# Patient Record
Sex: MALE | Race: WHITE | NOT HISPANIC OR LATINO
[De-identification: names, ages, dates, MRNs, and addresses within clinical notes are randomized per-mention and may not be internally consistent; named-entity substitution may affect disease eponyms.]

---

## 2017-02-27 ENCOUNTER — APPOINTMENT (OUTPATIENT)
Dept: SURGERY | Facility: CLINIC | Age: 52
End: 2017-02-27

## 2017-02-27 VITALS
DIASTOLIC BLOOD PRESSURE: 88 MMHG | RESPIRATION RATE: 15 BRPM | SYSTOLIC BLOOD PRESSURE: 149 MMHG | OXYGEN SATURATION: 98 % | HEART RATE: 65 BPM | TEMPERATURE: 97.9 F

## 2017-02-27 DIAGNOSIS — M22.40 CHONDROMALACIA PATELLAE, UNSPECIFIED KNEE: ICD-10-CM

## 2017-02-27 DIAGNOSIS — K62.89 OTHER SPECIFIED DISEASES OF ANUS AND RECTUM: ICD-10-CM

## 2017-02-27 DIAGNOSIS — K61.3 ISCHIORECTAL ABSCESS: ICD-10-CM

## 2017-02-27 DIAGNOSIS — M47.816 SPONDYLOSIS W/OUT MYELOPATHY OR RADICULOPATHY, LUMBAR REGION: ICD-10-CM

## 2017-02-27 DIAGNOSIS — K61.0 ANAL ABSCESS: ICD-10-CM

## 2017-02-27 RX ORDER — ASPIRIN 81 MG
81 TABLET, DELAYED RELEASE (ENTERIC COATED) ORAL
Refills: 0 | Status: ACTIVE | COMMUNITY

## 2017-02-27 RX ORDER — LACTOBACILLUS RHAMNOSUS GG 15B CELL
CAPSULE, SPRINKLE ORAL
Refills: 0 | Status: ACTIVE | COMMUNITY

## 2018-02-06 ENCOUNTER — TRANSCRIPTION ENCOUNTER (OUTPATIENT)
Age: 53
End: 2018-02-06

## 2019-03-05 ENCOUNTER — TRANSCRIPTION ENCOUNTER (OUTPATIENT)
Age: 54
End: 2019-03-05

## 2019-05-22 ENCOUNTER — APPOINTMENT (OUTPATIENT)
Dept: UROLOGY | Facility: CLINIC | Age: 54
End: 2019-05-22
Payer: COMMERCIAL

## 2019-05-22 VITALS — SYSTOLIC BLOOD PRESSURE: 143 MMHG | HEART RATE: 60 BPM | DIASTOLIC BLOOD PRESSURE: 85 MMHG | OXYGEN SATURATION: 98 %

## 2019-05-22 DIAGNOSIS — Z80.42 FAMILY HISTORY OF MALIGNANT NEOPLASM OF PROSTATE: ICD-10-CM

## 2019-05-22 PROCEDURE — 99203 OFFICE O/P NEW LOW 30 MIN: CPT

## 2020-06-01 ENCOUNTER — APPOINTMENT (OUTPATIENT)
Dept: NEUROLOGY | Facility: CLINIC | Age: 55
End: 2020-06-01

## 2021-02-09 ENCOUNTER — APPOINTMENT (OUTPATIENT)
Dept: MRI IMAGING | Facility: CLINIC | Age: 56
End: 2021-02-09

## 2021-02-09 ENCOUNTER — OUTPATIENT (OUTPATIENT)
Dept: OUTPATIENT SERVICES | Facility: HOSPITAL | Age: 56
LOS: 1 days | End: 2021-02-09
Payer: SELF-PAY

## 2021-02-09 DIAGNOSIS — Z00.8 ENCOUNTER FOR OTHER GENERAL EXAMINATION: ICD-10-CM

## 2021-02-09 PROCEDURE — 72141 MRI NECK SPINE W/O DYE: CPT | Mod: 26

## 2021-02-09 PROCEDURE — 72141 MRI NECK SPINE W/O DYE: CPT

## 2022-05-31 ENCOUNTER — APPOINTMENT (OUTPATIENT)
Dept: UROLOGY | Facility: CLINIC | Age: 57
End: 2022-05-31
Payer: COMMERCIAL

## 2022-05-31 VITALS
DIASTOLIC BLOOD PRESSURE: 97 MMHG | HEIGHT: 70 IN | SYSTOLIC BLOOD PRESSURE: 133 MMHG | HEART RATE: 67 BPM | WEIGHT: 190 LBS | TEMPERATURE: 98.3 F | BODY MASS INDEX: 27.2 KG/M2

## 2022-05-31 PROCEDURE — 99204 OFFICE O/P NEW MOD 45 MIN: CPT

## 2022-05-31 NOTE — HISTORY OF PRESENT ILLNESS
[FreeTextEntry1] : Dr. Landon Richmond \par [address]\[place] [address]\par \par CC: Elevated PSA \par \par 57 year old man with elevated PSA\par  \par PSA 1.7 (2017)\par PSA 2.4 (2019) \par \par Most recent PSA 3.01 ng/ml\par \par US was ordered; 42 cc prostate \par MRI 34 cc size, PIRAD2\par \par Excellent sexual function\par Urinary associated symptoms include nocturia 0-1x,. 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

## 2022-05-31 NOTE — PHYSICAL EXAM
[General Appearance - Well Developed] : well developed [General Appearance - Well Nourished] : well nourished [Normal Appearance] : normal appearance [Well Groomed] : well groomed [General Appearance - In No Acute Distress] : no acute distress [Edema] : no peripheral edema [] : no respiratory distress [Respiration, Rhythm And Depth] : normal respiratory rhythm and effort [Exaggerated Use Of Accessory Muscles For Inspiration] : no accessory muscle use [Abdomen Soft] : soft [Abdomen Tenderness] : non-tender [Costovertebral Angle Tenderness] : no ~M costovertebral angle tenderness [Urethral Meatus] : meatus normal [Penis Abnormality] : normal circumcised penis [Urinary Bladder Findings] : the bladder was normal on palpation [Scrotum] : the scrotum was normal [Epididymis] : the epididymides were normal [Testes Tenderness] : no tenderness of the testes [Testes Mass (___cm)] : there were no testicular masses [Prostate Tenderness] : the prostate was not tender [No Prostate Nodules] : no prostate nodules [No Focal Deficits] : no focal deficits [Oriented To Time, Place, And Person] : oriented to person, place, and time [Affect] : the affect was normal [Mood] : the mood was normal [Not Anxious] : not anxious [No Palpable Adenopathy] : no palpable adenopathy

## 2022-12-06 ENCOUNTER — APPOINTMENT (OUTPATIENT)
Dept: UROLOGY | Facility: CLINIC | Age: 57
End: 2022-12-06

## 2022-12-06 VITALS
TEMPERATURE: 98.3 F | DIASTOLIC BLOOD PRESSURE: 85 MMHG | OXYGEN SATURATION: 98 % | SYSTOLIC BLOOD PRESSURE: 148 MMHG | HEART RATE: 83 BPM

## 2022-12-06 DIAGNOSIS — Z00.00 ENCOUNTER FOR GENERAL ADULT MEDICAL EXAMINATION W/OUT ABNORMAL FINDINGS: ICD-10-CM

## 2022-12-06 DIAGNOSIS — R97.20 ELEVATED PROSTATE, SPECIFIC ANTIGEN [PSA]: ICD-10-CM

## 2022-12-06 PROCEDURE — 99214 OFFICE O/P EST MOD 30 MIN: CPT

## 2022-12-06 NOTE — HISTORY OF PRESENT ILLNESS
[FreeTextEntry1] : Dr. 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

## 2022-12-06 NOTE — ASSESSMENT
[FreeTextEntry1] : Diagnosis:\par Elevated PSA\par \par \par Plan:\par Normal LILIANA, reassuring MRI\par PSA today, if remains stable follow up in 1 year

## 2022-12-07 LAB
ALBUMIN SERPL ELPH-MCNC: 4.9 G/DL
ALP BLD-CCNC: 67 U/L
ALT SERPL-CCNC: 50 U/L
ANION GAP SERPL CALC-SCNC: 13 MMOL/L
AST SERPL-CCNC: 32 U/L
BASOPHILS # BLD AUTO: 0.05 K/UL
BASOPHILS NFR BLD AUTO: 0.6 %
BILIRUB SERPL-MCNC: 0.4 MG/DL
BUN SERPL-MCNC: 18 MG/DL
CALCIUM SERPL-MCNC: 9.6 MG/DL
CHLORIDE SERPL-SCNC: 105 MMOL/L
CHOLEST SERPL-MCNC: 223 MG/DL
CK SERPL-CCNC: 98 U/L
CO2 SERPL-SCNC: 24 MMOL/L
CREAT SERPL-MCNC: 0.78 MG/DL
EGFR: 105 ML/MIN/1.73M2
EOSINOPHIL # BLD AUTO: 0.07 K/UL
EOSINOPHIL NFR BLD AUTO: 0.9 %
ERYTHROCYTE [SEDIMENTATION RATE] IN BLOOD BY WESTERGREN METHOD: 20 MM/HR
FOLATE SERPL-MCNC: 17.7 NG/ML
GLUCOSE SERPL-MCNC: 113 MG/DL
HCT VFR BLD CALC: 42.9 %
HDLC SERPL-MCNC: 46 MG/DL
HGB BLD-MCNC: 13.9 G/DL
IMM GRANULOCYTES NFR BLD AUTO: 0.6 %
LDLC SERPL CALC-MCNC: 137 MG/DL
LYMPHOCYTES # BLD AUTO: 1.12 K/UL
LYMPHOCYTES NFR BLD AUTO: 14.1 %
MAN DIFF?: NORMAL
MCHC RBC-ENTMCNC: 29 PG
MCHC RBC-ENTMCNC: 32.4 GM/DL
MCV RBC AUTO: 89.6 FL
MONOCYTES # BLD AUTO: 0.46 K/UL
MONOCYTES NFR BLD AUTO: 5.8 %
NEUTROPHILS # BLD AUTO: 6.21 K/UL
NEUTROPHILS NFR BLD AUTO: 78 %
NONHDLC SERPL-MCNC: 177 MG/DL
PLATELET # BLD AUTO: 287 K/UL
POTASSIUM SERPL-SCNC: 4.5 MMOL/L
PROT SERPL-MCNC: 6.9 G/DL
PSA SERPL-MCNC: 3.87 NG/ML
RBC # BLD: 4.79 M/UL
RBC # FLD: 12.1 %
SODIUM SERPL-SCNC: 142 MMOL/L
T3 SERPL-MCNC: 100 NG/DL
T4 SERPL-MCNC: 7.8 UG/DL
TESTOST SERPL-MCNC: 307 NG/DL
TRIGL SERPL-MCNC: 203 MG/DL
TSH SERPL-ACNC: 1.39 UIU/ML
VIT B12 SERPL-MCNC: 1043 PG/ML
WBC # FLD AUTO: 7.96 K/UL

## 2022-12-12 ENCOUNTER — OUTPATIENT (OUTPATIENT)
Dept: OUTPATIENT SERVICES | Facility: HOSPITAL | Age: 57
LOS: 1 days | End: 2022-12-12

## 2022-12-12 ENCOUNTER — APPOINTMENT (OUTPATIENT)
Dept: MRI IMAGING | Facility: CLINIC | Age: 57
End: 2022-12-12

## 2022-12-12 PROCEDURE — 72197 MRI PELVIS W/O & W/DYE: CPT | Mod: 26

## 2022-12-13 ENCOUNTER — NON-APPOINTMENT (OUTPATIENT)
Age: 57
End: 2022-12-13

## 2023-05-23 ENCOUNTER — APPOINTMENT (OUTPATIENT)
Dept: HEART AND VASCULAR | Facility: CLINIC | Age: 58
End: 2023-05-23
Payer: COMMERCIAL

## 2023-05-23 VITALS
HEIGHT: 70 IN | SYSTOLIC BLOOD PRESSURE: 158 MMHG | WEIGHT: 198 LBS | BODY MASS INDEX: 28.35 KG/M2 | HEART RATE: 58 BPM | DIASTOLIC BLOOD PRESSURE: 94 MMHG

## 2023-05-23 DIAGNOSIS — Z78.9 OTHER SPECIFIED HEALTH STATUS: ICD-10-CM

## 2023-05-23 PROCEDURE — 99204 OFFICE O/P NEW MOD 45 MIN: CPT | Mod: 25

## 2023-05-23 PROCEDURE — 93000 ELECTROCARDIOGRAM COMPLETE: CPT

## 2023-05-23 RX ORDER — ROSUVASTATIN CALCIUM 20 MG/1
20 TABLET, FILM COATED ORAL
Refills: 0 | Status: ACTIVE | COMMUNITY

## 2023-05-23 NOTE — ASSESSMENT
[FreeTextEntry1] : pt with hyperlipidemia,hypertension and strong fh of cad \par pt started on Amlodipine 5mg po qd with bp in the 150s will schedule ett and echocardiogram for lvh

## 2023-05-23 NOTE — HISTORY OF PRESENT ILLNESS
[FreeTextEntry1] : pt with newonset hypertension pt started on amlodipine 5mg po qd \par pt has family history of mi onmmother side uncle and cousin \par pt is on crestor 20mg for years chol total 168

## 2023-05-25 ENCOUNTER — APPOINTMENT (OUTPATIENT)
Dept: HEART AND VASCULAR | Facility: CLINIC | Age: 58
End: 2023-05-25
Payer: COMMERCIAL

## 2023-05-25 VITALS
BODY MASS INDEX: 28.35 KG/M2 | WEIGHT: 198 LBS | RESPIRATION RATE: 17 BRPM | SYSTOLIC BLOOD PRESSURE: 160 MMHG | HEIGHT: 70 IN | DIASTOLIC BLOOD PRESSURE: 90 MMHG | HEART RATE: 77 BPM

## 2023-05-25 VITALS — SYSTOLIC BLOOD PRESSURE: 140 MMHG | HEART RATE: 80 BPM | DIASTOLIC BLOOD PRESSURE: 90 MMHG

## 2023-05-25 DIAGNOSIS — E78.5 HYPERLIPIDEMIA, UNSPECIFIED: ICD-10-CM

## 2023-05-25 DIAGNOSIS — I10 ESSENTIAL (PRIMARY) HYPERTENSION: ICD-10-CM

## 2023-05-25 PROCEDURE — 99214 OFFICE O/P EST MOD 30 MIN: CPT | Mod: 25

## 2023-05-25 PROCEDURE — 93015 CV STRESS TEST SUPVJ I&R: CPT

## 2023-05-25 NOTE — REASON FOR VISIT
[Hyperlipidemia] : hyperlipidemia [Hypertension] : hypertension [FreeTextEntry1] : Pt. is a 58 year old M with PMHx of HLD, new onset HTN and family hx of CAD (MI maternal uncle and cousin) who presents today for stress EKG.

## 2023-05-25 NOTE — HISTORY OF PRESENT ILLNESS
FreeTextEntry1] : Since his last visit, patient is feeling well overall. He presents today for a stress EKG. \par \par He was started on Amlodipine 5 mg last week by his PCP, Dr. Richmond for elevated BP at his appt of 140/90 mm Hg. His Amlodipine was increased to 10 mg 2 days ago as he was not getting to goal on the lower dose. \par \par He denies regular NSAID use or high salt intake in his diet. He admits to regular alcohol use, 1-2 glasses of liquor per night. No headaches with the elevated BP.\par \par He adheres to a Mediterranean-type diet.\par \par Pt. remains active boxing 3-4 times/week. No exertional chest pain or shortness of breath.\par \par Pt. denies any orthopnea, PND, palpitations, lightheadedness, syncope or lower extremity edema. \par \par ============================\par Labs/Diagnostics: \par 2022\par Lipid profile: TC: 223, T, HDL: 46, LDL: 137\par

## 2023-05-25 NOTE — ASSESSMENT
[FreeTextEntry1] : Pt. is a 58 year old M with PMHx of HLD, new onset HTN and family hx of CAD (MI maternal uncle and cousin) who presents today for stress EKG.\par \par HTN:\par - BP remains elevated on Amlodipine 10 mg daily \par - Will add Losartan 25 mg daily to his regimen for goal BP <130/<80\par - CMP (12/2022) revealed normal renal function/electrolytes \par - Encouraged the patient to monitor blood pressure at home, keep a log, and report results back to us for evaluation. Based on results, we will adjust the regimen as necessary. Also advised patient to be mindful of sodium and alcohol intake, as well as any over-the-counter medications (such as NSAIDs or decongestants) that may increase blood pressure\par \par HLD:\par - Last LDL (12/2022) 137 above goal LDL < 100 \par - Pt had recent labs with PCP, Dr Richmond - will get a copy\par - His Rosuvastatin was increased from 10 mg to 20 mg this week per his PCP \par - Coronary artery calcium (CAC) ordered for further risk stratification\par - Encouraged patient to continue healthy exercise and eating habits, focusing on a Mediterranean style of eating and aiming for the recommended 150 minutes per week of moderate physical activity \par \par ASCVD risk: \par - Elevated ASCVD risk at 10.04%\par - Stress EKG today revealed excellent exercise capacity and no ischemic EKG changes - low suspicion for significant CAD. Continue with risk factor optimization \par - Pt. to RTC for echocardiogram.\par \par Pt. to RTC in one month for BP check, CMP and echocardiogram.\par I, Dr. Saucedo, personally performed the evaluation and management (E/M) services for this established patient who presents today with (a) new problem(s)/exacerbation of (an) existing condition(s). That E/M includes conducting the clinically appropriate interval history &/or exam, assessing all new/exacerbated conditions, and establishing a new plan of care. Today, my ACP, Minoo Bose, was here to observe &/or participate in the visit & follow plan of care established by me.

## 2023-06-22 ENCOUNTER — APPOINTMENT (OUTPATIENT)
Dept: UROLOGY | Facility: CLINIC | Age: 58
End: 2023-06-22
Payer: COMMERCIAL

## 2023-06-22 VITALS
HEART RATE: 85 BPM | OXYGEN SATURATION: 100 % | WEIGHT: 198 LBS | TEMPERATURE: 97.9 F | SYSTOLIC BLOOD PRESSURE: 152 MMHG | DIASTOLIC BLOOD PRESSURE: 81 MMHG | HEIGHT: 70 IN | BODY MASS INDEX: 28.35 KG/M2

## 2023-06-22 PROCEDURE — 99213 OFFICE O/P EST LOW 20 MIN: CPT

## 2023-06-22 NOTE — HISTORY OF PRESENT ILLNESS
[FreeTextEntry1] : Dr. Landon Richmond \par [address]\[place] [address]\par \par Marielos Saucedo MD\par [address], [place] [address]\par \par \par CC: Elevated PSA\par \par 57 yr old male being followed for elevated PSA\par Urinary symptoms stable and unchanged; he is taking saw palmetto \par Recent PSA down 0.5 ng. \par \par PSA 1.7 (2017)\par PSA 2.4 (2019) \par PSA 3.87 (12/2022)\par PSA 3.36 (5/23/2023) \par \par MRI 34 cc size, PIRAD2 (5/2022) \par MRI (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

## 2023-06-22 NOTE — ASSESSMENT
[FreeTextEntry1] : Diagnosis: \par PSA screening/elevation \par BPH\par \par Plan to stop saw palmetto and reassess symptoms\par PSA and reassess in one year \par \par

## 2023-06-28 ENCOUNTER — APPOINTMENT (OUTPATIENT)
Dept: HEART AND VASCULAR | Facility: CLINIC | Age: 58
End: 2023-06-28
Payer: COMMERCIAL

## 2023-06-28 PROCEDURE — 99214 OFFICE O/P EST MOD 30 MIN: CPT | Mod: 25

## 2023-06-28 PROCEDURE — 93306 TTE W/DOPPLER COMPLETE: CPT

## 2023-06-28 RX ORDER — AMLODIPINE BESYLATE 10 MG/1
10 TABLET ORAL DAILY
Qty: 1 | Refills: 1 | Status: DISCONTINUED | COMMUNITY
Start: 1900-01-01 | End: 2023-06-28

## 2023-06-28 NOTE — ASSESSMENT
[FreeTextEntry1] : Pt. is a 58 year old M with PMHx of HLD, new onset HTN and family hx of CAD (MI maternal uncle and cousin) who presents today for echocardiogram which is wnl\par \par HTN:\par - BP remains elevated on [id number] will increase to 100mg \par \par - CMP (12/2022) revealed normal renal function/electrolytes \par - Encouraged the patient to monitor blood pressure at home, keep a log, and report results back to us for evaluation. Based on results, we will adjust the regimen as necessary. Also advised patient to be mindful of sodium and alcohol intake, as well as any over-the-counter medications (such as NSAIDs or decongestants) that may increase blood pressure\par \par HLD:\par - Last LDL (12/2022) 137 above goal LDL < 100 \par - Pt had recent labs with PCP, Dr Richmond - will get a copy\par - His Rosuvastatin was increased from 10 mg to 20 mg \par - Coronary artery calcium (CAC) ordered for further risk stratification\par - Encouraged patient to continue healthy exercise and eating habits, focusing on a Mediterranean style of eating and aiming for the recommended 150 minutes per week of moderate physical activity \par \par ASCVD risk: \par - Elevated ASCVD risk at 10.04%\par - Stress EKG today revealed excellent exercise capacity and no ischemic EKG changes - low suspicion for significant CAD. Continue with risk factor optimization \par \par \par     no

## 2023-06-28 NOTE — HISTORY OF PRESENT ILLNESS
[FreeTextEntry1] : Since his last visit, patient is feeling well overall. \par Patient stopped his amlodipine due to ankle swelling and is now taking losartan 50 mg a day\par \par He denies regular NSAID use or high salt intake in his diet. He admits to regular alcohol use, 1-2 glasses of liquor per night. No headaches with the elevated BP.\par \par He adheres to a Mediterranean-type diet.\par \par Pt. remains active boxing 3-4 times/week. No exertional chest pain or shortness of breath.\par \par Pt. denies any orthopnea, PND, palpitations, lightheadedness, syncope or lower extremity edema. \par \par ============================\par Labs/Diagnostics: \par 2022\par Lipid profile: TC: 223, T, HDL: 46, LDL: 137\par

## 2023-07-10 ENCOUNTER — APPOINTMENT (OUTPATIENT)
Dept: CT IMAGING | Facility: HOSPITAL | Age: 58
End: 2023-07-10
Payer: COMMERCIAL

## 2023-07-10 ENCOUNTER — OUTPATIENT (OUTPATIENT)
Dept: OUTPATIENT SERVICES | Facility: HOSPITAL | Age: 58
LOS: 1 days | End: 2023-07-10
Payer: SELF-PAY

## 2023-07-10 PROCEDURE — 75571 CT HRT W/O DYE W/CA TEST: CPT

## 2023-07-10 PROCEDURE — 75571 CT HRT W/O DYE W/CA TEST: CPT | Mod: 26

## 2023-08-03 ENCOUNTER — APPOINTMENT (OUTPATIENT)
Dept: HEART AND VASCULAR | Facility: CLINIC | Age: 58
End: 2023-08-03

## 2024-05-21 ENCOUNTER — RX RENEWAL (OUTPATIENT)
Age: 59
End: 2024-05-21

## 2024-05-21 ENCOUNTER — APPOINTMENT (OUTPATIENT)
Dept: UROLOGY | Facility: CLINIC | Age: 59
End: 2024-05-21

## 2024-05-21 RX ORDER — LOSARTAN POTASSIUM 100 MG/1
100 TABLET, FILM COATED ORAL DAILY
Qty: 30 | Refills: 1 | Status: ACTIVE | COMMUNITY
Start: 2023-05-25 | End: 1900-01-01

## 2024-06-10 ENCOUNTER — RX RENEWAL (OUTPATIENT)
Age: 59
End: 2024-06-10

## 2024-06-10 RX ORDER — ROSUVASTATIN CALCIUM 40 MG/1
40 TABLET, FILM COATED ORAL
Qty: 30 | Refills: 1 | Status: ACTIVE | COMMUNITY
Start: 2024-06-10 | End: 1900-01-01

## 2024-06-25 ENCOUNTER — APPOINTMENT (OUTPATIENT)
Dept: UROLOGY | Facility: CLINIC | Age: 59
End: 2024-06-25
Payer: COMMERCIAL

## 2024-06-25 VITALS
TEMPERATURE: 98.3 F | HEART RATE: 77 BPM | DIASTOLIC BLOOD PRESSURE: 77 MMHG | OXYGEN SATURATION: 98 % | SYSTOLIC BLOOD PRESSURE: 152 MMHG

## 2024-06-25 PROCEDURE — 99213 OFFICE O/P EST LOW 20 MIN: CPT

## 2024-07-24 ENCOUNTER — RX RENEWAL (OUTPATIENT)
Age: 59
End: 2024-07-24

## 2024-08-09 ENCOUNTER — RX RENEWAL (OUTPATIENT)
Age: 59
End: 2024-08-09

## 2024-10-28 ENCOUNTER — RX RENEWAL (OUTPATIENT)
Age: 59
End: 2024-10-28

## 2024-11-15 ENCOUNTER — NON-APPOINTMENT (OUTPATIENT)
Age: 59
End: 2024-11-15

## 2024-11-26 ENCOUNTER — RX RENEWAL (OUTPATIENT)
Age: 59
End: 2024-11-26

## 2025-06-26 ENCOUNTER — APPOINTMENT (OUTPATIENT)
Dept: UROLOGY | Facility: CLINIC | Age: 60
End: 2025-06-26
Payer: COMMERCIAL

## 2025-06-26 VITALS
OXYGEN SATURATION: 97 % | HEIGHT: 70 IN | WEIGHT: 189 LBS | DIASTOLIC BLOOD PRESSURE: 81 MMHG | TEMPERATURE: 97.2 F | SYSTOLIC BLOOD PRESSURE: 135 MMHG | HEART RATE: 66 BPM | BODY MASS INDEX: 27.06 KG/M2

## 2025-06-26 PROBLEM — N50.819 TESTICULAR DISCOMFORT: Status: ACTIVE | Noted: 2025-06-26

## 2025-06-26 PROCEDURE — 99024 POSTOP FOLLOW-UP VISIT: CPT

## 2025-06-28 ENCOUNTER — OUTPATIENT (OUTPATIENT)
Dept: OUTPATIENT SERVICES | Facility: HOSPITAL | Age: 60
LOS: 1 days | End: 2025-06-28
Payer: SELF-PAY

## 2025-06-28 ENCOUNTER — RESULT REVIEW (OUTPATIENT)
Age: 60
End: 2025-06-28

## 2025-06-28 ENCOUNTER — APPOINTMENT (OUTPATIENT)
Dept: MRI IMAGING | Facility: CLINIC | Age: 60
End: 2025-06-28

## 2025-06-28 DIAGNOSIS — R97.20 ELEVATED PROSTATE SPECIFIC ANTIGEN [PSA]: ICD-10-CM

## 2025-06-28 DIAGNOSIS — Z00.8 ENCOUNTER FOR OTHER GENERAL EXAMINATION: ICD-10-CM

## 2025-06-28 PROCEDURE — 72197 MRI PELVIS W/O & W/DYE: CPT | Mod: 26

## 2025-06-28 PROCEDURE — 72197 MRI PELVIS W/O & W/DYE: CPT

## 2025-06-28 PROCEDURE — A9585: CPT

## 2025-06-28 PROCEDURE — 76498P: CUSTOM | Mod: 26

## 2025-06-28 PROCEDURE — 76498 UNLISTED MR PROCEDURE: CPT

## 2025-07-02 ENCOUNTER — NON-APPOINTMENT (OUTPATIENT)
Age: 60
End: 2025-07-02

## 2025-07-03 ENCOUNTER — NON-APPOINTMENT (OUTPATIENT)
Age: 60
End: 2025-07-03

## 2025-07-03 ENCOUNTER — APPOINTMENT (OUTPATIENT)
Dept: HEART AND VASCULAR | Facility: CLINIC | Age: 60
End: 2025-07-03
Payer: COMMERCIAL

## 2025-07-03 ENCOUNTER — APPOINTMENT (OUTPATIENT)
Dept: UROLOGY | Facility: CLINIC | Age: 60
End: 2025-07-03
Payer: COMMERCIAL

## 2025-07-03 VITALS
HEART RATE: 69 BPM | WEIGHT: 189 LBS | TEMPERATURE: 97.1 F | BODY MASS INDEX: 27.06 KG/M2 | DIASTOLIC BLOOD PRESSURE: 83 MMHG | HEIGHT: 70 IN | SYSTOLIC BLOOD PRESSURE: 139 MMHG

## 2025-07-03 VITALS
OXYGEN SATURATION: 98 % | DIASTOLIC BLOOD PRESSURE: 78 MMHG | SYSTOLIC BLOOD PRESSURE: 125 MMHG | HEART RATE: 65 BPM | WEIGHT: 189.04 LBS | HEIGHT: 70 IN | BODY MASS INDEX: 27.06 KG/M2

## 2025-07-03 PROBLEM — Z01.810 PRE-OPERATIVE CARDIOVASCULAR EXAMINATION: Status: ACTIVE | Noted: 2025-07-03

## 2025-07-03 PROCEDURE — 99214 OFFICE O/P EST MOD 30 MIN: CPT

## 2025-07-03 PROCEDURE — G2211 COMPLEX E/M VISIT ADD ON: CPT | Mod: NC

## 2025-07-03 PROCEDURE — 93000 ELECTROCARDIOGRAM COMPLETE: CPT

## 2025-07-03 PROCEDURE — 99204 OFFICE O/P NEW MOD 45 MIN: CPT

## 2025-07-03 RX ORDER — CHROMIUM 200 MCG
25 MCG TABLET ORAL DAILY
Refills: 0 | Status: ACTIVE | COMMUNITY
Start: 2025-07-03

## 2025-07-03 NOTE — ASU PATIENT PROFILE, ADULT - NSICDXPASTMEDICALHX_GEN_ALL_CORE_FT
PAST MEDICAL HISTORY:  BPH with elevated PSA     Chondromalacia of patella     HLD (hyperlipidemia)     HTN (hypertension)     Ischiorectal abscess     Lumbar spondylosis

## 2025-07-03 NOTE — ASU PATIENT PROFILE, ADULT - ABLE TO REACH PT
vm to arrive at 145pm at [address]  for sx at 345pm.  NPO after midnight,  May have  water 3 hrs prior to arrival time.  NO valuables, jewelry or large sums of money.  Please make arrangements for someone to pickup you up./no

## 2025-07-04 ENCOUNTER — OUTPATIENT (OUTPATIENT)
Dept: OUTPATIENT SERVICES | Facility: HOSPITAL | Age: 60
LOS: 1 days | End: 2025-07-04
Payer: SELF-PAY

## 2025-07-04 DIAGNOSIS — Z00.8 ENCOUNTER FOR OTHER GENERAL EXAMINATION: ICD-10-CM

## 2025-07-07 ENCOUNTER — TRANSCRIPTION ENCOUNTER (OUTPATIENT)
Age: 60
End: 2025-07-07

## 2025-07-07 ENCOUNTER — RESULT REVIEW (OUTPATIENT)
Age: 60
End: 2025-07-07

## 2025-07-07 ENCOUNTER — NON-APPOINTMENT (OUTPATIENT)
Age: 60
End: 2025-07-07

## 2025-07-07 ENCOUNTER — APPOINTMENT (OUTPATIENT)
Dept: UROLOGY | Facility: AMBULATORY SURGERY CENTER | Age: 60
End: 2025-07-07

## 2025-07-07 ENCOUNTER — OUTPATIENT (OUTPATIENT)
Dept: OUTPATIENT SERVICES | Facility: HOSPITAL | Age: 60
LOS: 1 days | Discharge: HOME | End: 2025-07-07
Payer: COMMERCIAL

## 2025-07-07 VITALS
DIASTOLIC BLOOD PRESSURE: 83 MMHG | OXYGEN SATURATION: 97 % | HEIGHT: 70 IN | WEIGHT: 194.45 LBS | TEMPERATURE: 99 F | HEART RATE: 77 BPM | RESPIRATION RATE: 16 BRPM | SYSTOLIC BLOOD PRESSURE: 131 MMHG

## 2025-07-07 VITALS
DIASTOLIC BLOOD PRESSURE: 62 MMHG | HEART RATE: 72 BPM | RESPIRATION RATE: 16 BRPM | OXYGEN SATURATION: 99 % | SYSTOLIC BLOOD PRESSURE: 120 MMHG

## 2025-07-07 DIAGNOSIS — R97.20 ELEVATED PROSTATE SPECIFIC ANTIGEN [PSA]: ICD-10-CM

## 2025-07-07 LAB
ANION GAP SERPL CALC-SCNC: 16 MMOL/L
APPEARANCE: CLEAR
APTT BLD: 36.5 SEC
BACTERIA UR CULT: NORMAL
BACTERIA: NEGATIVE /HPF
BILIRUBIN URINE: NEGATIVE
BLOOD URINE: NEGATIVE
BUN SERPL-MCNC: 23 MG/DL
CALCIUM SERPL-MCNC: 10.2 MG/DL
CAST: 0 /LPF
CHLORIDE SERPL-SCNC: 104 MMOL/L
CO2 SERPL-SCNC: 24 MMOL/L
COLOR: YELLOW
CREAT SERPL-MCNC: 0.77 MG/DL
EGFRCR SERPLBLD CKD-EPI 2021: 104 ML/MIN/1.73M2
EPITHELIAL CELLS: 0 /HPF
GLUCOSE QUALITATIVE U: NEGATIVE MG/DL
GLUCOSE SERPL-MCNC: 108 MG/DL
HCT VFR BLD CALC: 45.1 %
HGB BLD-MCNC: 13.7 G/DL
INR PPP: 0.9 RATIO
KETONES URINE: NEGATIVE MG/DL
LEUKOCYTE ESTERASE URINE: ABNORMAL
MCHC RBC-ENTMCNC: 29.1 PG
MCHC RBC-ENTMCNC: 30.4 G/DL
MCV RBC AUTO: 95.8 FL
MICROSCOPIC-UA: NORMAL
NITRITE URINE: NEGATIVE
PH URINE: 6
PLATELET # BLD AUTO: 223 K/UL
POTASSIUM SERPL-SCNC: 5.2 MMOL/L
PROTEIN URINE: NEGATIVE MG/DL
PT BLD: 10.7 SEC
RBC # BLD: 4.71 M/UL
RBC # FLD: 12.2 %
RED BLOOD CELLS URINE: 1 /HPF
SODIUM SERPL-SCNC: 144 MMOL/L
SPECIFIC GRAVITY URINE: 1.02
UROBILINOGEN URINE: 1 MG/DL
WBC # FLD AUTO: 6.66 K/UL
WHITE BLOOD CELLS URINE: 0 /HPF

## 2025-07-07 PROCEDURE — 88381 MICRODISSECTION MANUAL: CPT | Mod: 26

## 2025-07-07 PROCEDURE — 55706 BX PRST8 NDL SAT SAMPLING: CPT

## 2025-07-07 PROCEDURE — G0416: CPT | Mod: 26

## 2025-07-07 PROCEDURE — 76999F: CUSTOM | Mod: 26

## 2025-07-07 PROCEDURE — C8001: CPT

## 2025-07-07 RX ORDER — ONDANSETRON HCL/PF 4 MG/2 ML
4 VIAL (ML) INJECTION ONCE
Refills: 0 | Status: DISCONTINUED | OUTPATIENT
Start: 2025-07-07 | End: 2025-07-07

## 2025-07-07 RX ORDER — ACETAMINOPHEN 500 MG/5ML
1000 LIQUID (ML) ORAL ONCE
Refills: 0 | Status: DISCONTINUED | OUTPATIENT
Start: 2025-07-07 | End: 2025-07-07

## 2025-07-07 RX ORDER — SODIUM CHLORIDE 9 G/1000ML
1000 INJECTION, SOLUTION INTRAVENOUS
Refills: 0 | Status: DISCONTINUED | OUTPATIENT
Start: 2025-07-07 | End: 2025-07-07

## 2025-07-07 RX ORDER — ROSUVASTATIN CALCIUM 5 MG/1
1 TABLET, FILM COATED ORAL
Refills: 0 | DISCHARGE

## 2025-07-07 RX ORDER — FENTANYL CITRATE-0.9 % NACL/PF 100MCG/2ML
25 SYRINGE (ML) INTRAVENOUS
Refills: 0 | Status: DISCONTINUED | OUTPATIENT
Start: 2025-07-07 | End: 2025-07-07

## 2025-07-07 RX ORDER — OMEPRAZOLE 20 MG/1
0 CAPSULE, DELAYED RELEASE ORAL
Refills: 0 | DISCHARGE

## 2025-07-07 RX ORDER — LOSARTAN POTASSIUM 100 MG/1
1 TABLET, FILM COATED ORAL
Refills: 0 | DISCHARGE

## 2025-07-07 NOTE — ASU DISCHARGE PLAN (ADULT/PEDIATRIC) - ASU DC SPECIAL INSTRUCTIONSFT
PROSTATE BIOPSY    GENERAL: Expect blood in the urine, stool, and ejaculate for up to two (2) months postoperatively. This is normal and to be expected after this procedure. Increase hydration to keep the urine as clear as possible.    SURGICAL WOUND: There are often lumps and bumps that can be felt in the perineum (skin between scrotum and anus) for up to two (2) months or more post operatively. These are of no concern and with time they will soften and disappear.  Any “black and blue” bruising areas will also resolve.  You may apply an ice-pack for 15 minutes out of every hour for the first 24 -36 hours to minimize pain and swelling. You may apply over the counter Bacitracin to the wound several times a day, and keep covered with over the counter gauze as necessary.      PAIN: You may have some intermittent pain for up to six (6) weeks post operatively. Pain does not signify any problem unless associated with fever, chills, or inability to void.  If you experience any fevers or chills please call immediately as this may be signs of an infection. You may take Tylenol (acetaminophen) 650-975mg and/or Motrin (ibuprofen) 400-600mg, both available over the counter, for pain every 6 hours as needed. Do not exceed 4000mg of Tylenol (acetaminophen) daily. You may alternate these medications such that you take one or the other every 3 hours for around the clock pain coverage.    ANTICOAGULATION: If you are taking any blood thinning medications, please discuss with your urologist prior to restarting these medications unless otherwise specified.    BATHING: You may shower with soap and water, and pat dry the needle insertion sites in the perineum. Avoid sitting in water for prolonged periods of time for the next few days.    DIET: You may resume your regular diet and regular medication regimen.    ACTIVITY: No heavy lifting or strenuous exercise until you are evaluated at your post-operative appointment. Otherwise, you may return to your usual level of physical activity.    FOLLOW-UP: If you did not already schedule your post-operative appointment, please call your urologist to schedule and follow-up appointment.    CALL YOUR UROLOGIST IF: You have any bleeding that does not stop, inability to void >8 hours, fever over 100.4 F, chills, persistent nausea/vomiting, changes in your incision concerning for infection, or if your pain is not controlled on your discharge pain medications.

## 2025-07-07 NOTE — BRIEF OPERATIVE NOTE - NSICDXBRIEFPROCEDURE_GEN_ALL_CORE_FT
PROCEDURES:  Transperineal template-guided mapping biopsy of prostate 07-Jul-2025 19:16:53  Aditya Prater

## 2025-07-07 NOTE — ASU DISCHARGE PLAN (ADULT/PEDIATRIC) - FINANCIAL ASSISTANCE
[facility] provides services at a reduced cost to those who are determined to be eligible through [facility]’s financial assistance program. Information regarding [facility]’s financial assistance program can be found by going to [email or web address] or by calling [phone].

## 2025-07-07 NOTE — ASU DISCHARGE PLAN (ADULT/PEDIATRIC) - CARE PROVIDER_API CALL
Gavin Smiley  Urology  [address]  [place] [address]  Phone: [phone]  Fax: [phone]  Follow Up Time: Routine

## 2025-07-10 ENCOUNTER — NON-APPOINTMENT (OUTPATIENT)
Age: 60
End: 2025-07-10

## 2025-07-10 PROBLEM — K61.39: Chronic | Status: ACTIVE | Noted: 2025-07-07

## 2025-07-10 PROBLEM — M22.40 CHONDROMALACIA PATELLAE, UNSPECIFIED KNEE: Chronic | Status: ACTIVE | Noted: 2025-07-07

## 2025-07-10 PROBLEM — N40.0 BENIGN PROSTATIC HYPERPLASIA WITHOUT LOWER URINARY TRACT SYMPTOMS: Chronic | Status: ACTIVE | Noted: 2025-07-07

## 2025-07-10 PROBLEM — M47.816 SPONDYLOSIS WITHOUT MYELOPATHY OR RADICULOPATHY, LUMBAR REGION: Chronic | Status: ACTIVE | Noted: 2025-07-07

## 2025-07-10 PROBLEM — E78.5 HYPERLIPIDEMIA, UNSPECIFIED: Chronic | Status: ACTIVE | Noted: 2025-07-07

## 2025-07-10 PROBLEM — I10 ESSENTIAL (PRIMARY) HYPERTENSION: Chronic | Status: ACTIVE | Noted: 2025-07-07

## 2025-07-15 ENCOUNTER — APPOINTMENT (OUTPATIENT)
Dept: UROLOGY | Facility: CLINIC | Age: 60
End: 2025-07-15
Payer: COMMERCIAL

## 2025-07-15 VITALS
OXYGEN SATURATION: 99 % | BODY MASS INDEX: 27.2 KG/M2 | HEART RATE: 69 BPM | DIASTOLIC BLOOD PRESSURE: 76 MMHG | SYSTOLIC BLOOD PRESSURE: 133 MMHG | WEIGHT: 190 LBS | HEIGHT: 70 IN | TEMPERATURE: 97.4 F

## 2025-07-15 PROCEDURE — 99214 OFFICE O/P EST MOD 30 MIN: CPT

## (undated) DEVICE — GLV 7.5 PROTEXIS (WHITE)

## (undated) DEVICE — WARMING BLANKET UPPER ADULT

## (undated) DEVICE — NDL BIOPSY CHIBA 22G X 20CM

## (undated) DEVICE — NDL HYPO REGULAR BEVEL 25G X 1.5" (BLUE)

## (undated) DEVICE — DRAPE TOWEL BLUE 17" X 24"

## (undated) DEVICE — DRAPE MEDIUM SHEET 44" X 70"

## (undated) DEVICE — DRSG TELFA 3 X 8

## (undated) DEVICE — SYR TOOMEY 50ML

## (undated) DEVICE — BALLOON ENDOCAVITY 2X14CM

## (undated) DEVICE — PREP CHLORAPREP HI-LITE ORANGE 26ML

## (undated) DEVICE — SYR LUER LOK 10CC

## (undated) DEVICE — PLASTIC SOLUTION BOWL 160Z

## (undated) DEVICE — SYR LUER LOK 50CC

## (undated) DEVICE — VENODYNE/SCD SLEEVE CALF MEDIUM

## (undated) DEVICE — GRID BRACHYTHERAPY EZ 18G

## (undated) DEVICE — NDL MAX CORE 18G X 25CM